# Patient Record
Sex: FEMALE | Race: WHITE | NOT HISPANIC OR LATINO | Employment: FULL TIME | URBAN - METROPOLITAN AREA
[De-identification: names, ages, dates, MRNs, and addresses within clinical notes are randomized per-mention and may not be internally consistent; named-entity substitution may affect disease eponyms.]

---

## 2020-01-10 ENCOUNTER — HOSPITAL ENCOUNTER (OUTPATIENT)
Facility: MEDICAL CENTER | Age: 20
End: 2020-01-10
Attending: NURSE PRACTITIONER
Payer: MEDICAID

## 2020-01-10 ENCOUNTER — OFFICE VISIT (OUTPATIENT)
Dept: URGENT CARE | Facility: PHYSICIAN GROUP | Age: 20
End: 2020-01-10
Payer: MEDICAID

## 2020-01-10 VITALS
WEIGHT: 118 LBS | OXYGEN SATURATION: 97 % | DIASTOLIC BLOOD PRESSURE: 72 MMHG | HEART RATE: 84 BPM | SYSTOLIC BLOOD PRESSURE: 110 MMHG | RESPIRATION RATE: 16 BRPM | TEMPERATURE: 98.9 F

## 2020-01-10 DIAGNOSIS — R10.2 VAGINAL PAIN: ICD-10-CM

## 2020-01-10 LAB
APPEARANCE UR: NORMAL
BILIRUB UR STRIP-MCNC: NEGATIVE MG/DL
COLOR UR AUTO: YELLOW
GLUCOSE UR STRIP.AUTO-MCNC: NEGATIVE MG/DL
INT CON NEG: NORMAL
INT CON POS: NORMAL
KETONES UR STRIP.AUTO-MCNC: NORMAL MG/DL
LEUKOCYTE ESTERASE UR QL STRIP.AUTO: NORMAL
NITRITE UR QL STRIP.AUTO: NEGATIVE
PH UR STRIP.AUTO: 6 [PH] (ref 5–8)
POC URINE PREGNANCY TEST: NEGATIVE
PROT UR QL STRIP: 100 MG/DL
RBC UR QL AUTO: NORMAL
SP GR UR STRIP.AUTO: >1.03
UROBILINOGEN UR STRIP-MCNC: 1 MG/DL

## 2020-01-10 PROCEDURE — 87591 N.GONORRHOEAE DNA AMP PROB: CPT

## 2020-01-10 PROCEDURE — 87186 SC STD MICRODIL/AGAR DIL: CPT

## 2020-01-10 PROCEDURE — 87510 GARDNER VAG DNA DIR PROBE: CPT

## 2020-01-10 PROCEDURE — 87660 TRICHOMONAS VAGIN DIR PROBE: CPT

## 2020-01-10 PROCEDURE — 81025 URINE PREGNANCY TEST: CPT | Performed by: NURSE PRACTITIONER

## 2020-01-10 PROCEDURE — 99203 OFFICE O/P NEW LOW 30 MIN: CPT | Mod: 25 | Performed by: NURSE PRACTITIONER

## 2020-01-10 PROCEDURE — 87086 URINE CULTURE/COLONY COUNT: CPT

## 2020-01-10 PROCEDURE — 81002 URINALYSIS NONAUTO W/O SCOPE: CPT | Performed by: NURSE PRACTITIONER

## 2020-01-10 PROCEDURE — 87529 HSV DNA AMP PROBE: CPT

## 2020-01-10 PROCEDURE — 87491 CHLMYD TRACH DNA AMP PROBE: CPT

## 2020-01-10 PROCEDURE — 87480 CANDIDA DNA DIR PROBE: CPT

## 2020-01-10 PROCEDURE — 87077 CULTURE AEROBIC IDENTIFY: CPT

## 2020-01-10 PROCEDURE — 99000 SPECIMEN HANDLING OFFICE-LAB: CPT | Performed by: NURSE PRACTITIONER

## 2020-01-10 RX ORDER — LIDOCAINE 5% 5 G/100G
1 CREAM TOPICAL 3 TIMES DAILY PRN
Qty: 1 TUBE | Refills: 0 | Status: SHIPPED | OUTPATIENT
Start: 2020-01-10 | End: 2020-03-30

## 2020-01-10 SDOH — HEALTH STABILITY: MENTAL HEALTH: HOW OFTEN DO YOU HAVE A DRINK CONTAINING ALCOHOL?: NEVER

## 2020-01-10 ASSESSMENT — ENCOUNTER SYMPTOMS
VAGINITIS: 1
CONSTIPATION: 0
CHILLS: 0
FLANK PAIN: 0
WHEEZING: 0
NAUSEA: 1
ABDOMINAL PAIN: 0
EYES NEGATIVE: 1
DIARRHEA: 0
FEVER: 0
RESPIRATORY NEGATIVE: 1
CARDIOVASCULAR NEGATIVE: 1
SHORTNESS OF BREATH: 0
VOMITING: 0

## 2020-01-10 NOTE — PROGRESS NOTES
Subjective:   Maddison Quinn is a 19 y.o. female who presents for Vaginal Pain (noticed she has lumps on her vaginal is not very painful )        Vaginitis   The patient's primary symptoms include genital lesions and a genital odor. The patient's pertinent negatives include no missed menses or vaginal discharge. This is a new problem. The current episode started in the past 7 days. The problem occurs constantly. The problem has been unchanged. The pain is moderate. Associated symptoms include dysuria, nausea and painful intercourse. Pertinent negatives include no abdominal pain, chills, constipation, diarrhea, fever, flank pain, frequency, hematuria, rash, urgency or vomiting. She has tried nothing for the symptoms. The treatment provided no relief. She is sexually active. No, her partner does not have an STD.        Review of Systems   Constitutional: Negative for chills and fever.   Eyes: Negative.    Respiratory: Negative.  Negative for shortness of breath and wheezing.    Cardiovascular: Negative.  Negative for chest pain.   Gastrointestinal: Positive for nausea. Negative for abdominal pain, constipation, diarrhea and vomiting.   Genitourinary: Positive for dysuria. Negative for flank pain, frequency, hematuria, missed menses, urgency and vaginal discharge.        Painful vaginal opening when urinating   Skin: Negative.  Negative for rash.     Patient's PMH, SocHx, SurgHx, FamHx, Drug allergies and medications reviewed.     Objective:   /72   Pulse 84   Temp 37.2 °C (98.9 °F)   Resp 16   Wt 53.5 kg (118 lb)   SpO2 97%   Physical Exam  Vitals signs reviewed. Exam conducted with a chaperone present.   Constitutional:       Appearance: She is well-developed.   HENT:      Right Ear: Hearing normal.      Left Ear: Hearing normal.   Eyes:      Conjunctiva/sclera: Conjunctivae normal.      Pupils: Pupils are equal, round, and reactive to light.   Cardiovascular:      Rate and Rhythm: Normal rate and  regular rhythm.      Heart sounds: Normal heart sounds. No murmur.   Pulmonary:      Effort: Pulmonary effort is normal. No respiratory distress.      Breath sounds: Normal breath sounds.   Abdominal:      General: Bowel sounds are normal.      Palpations: Abdomen is soft.      Tenderness: There is no tenderness.   Genitourinary:     Labia:         Right: Tenderness and lesion present.         Left: No tenderness or lesion.           Comments: Did not put speculum into vaginal canal due to patient's pain.  Skin:     General: Skin is warm and dry.      Capillary Refill: Capillary refill takes less than 2 seconds.   Neurological:      Mental Status: She is alert and oriented to person, place, and time.   Psychiatric:         Mood and Affect: Mood normal.         Speech: Speech normal.         Behavior: Behavior normal.         Thought Content: Thought content normal.         Judgment: Judgment normal.           Assessment/Plan:   Assessment    1. Vaginal pain  - POCT Urinalysis  - POCT Pregnancy  - VAGINAL PATHOGENS DNA PANEL; Future  - HSV 1/2 By PCR(Herpes)+[id number]; Future  - CHLAMYDIA/GC PCR URINE OR SWAB; Future  - Lidocaine 5 % Cream; 1 Application by Apply externally route 3 times a day as needed.  Dispense: 1 Tube; Refill: 0  - URINE CULTURE(NEW); Future    UA with moderate leuks and blood - ? Related to contamination. Will send for culture. Will call with results once available.  Pregnancy negative    Differential diagnosis, natural history, supportive care, and indications for immediate follow-up discussed.     **Please note that all invasive procedures during this visit were performed by myself and/or the Medical Assistant under the supervision of the PA or MD in office**

## 2020-01-11 ENCOUNTER — TELEPHONE (OUTPATIENT)
Dept: URGENT CARE | Facility: PHYSICIAN GROUP | Age: 20
End: 2020-01-11

## 2020-01-11 DIAGNOSIS — N76.0 BACTERIAL VAGINOSIS: ICD-10-CM

## 2020-01-11 DIAGNOSIS — B96.89 BACTERIAL VAGINOSIS: ICD-10-CM

## 2020-01-11 DIAGNOSIS — R10.2 VAGINAL PAIN: ICD-10-CM

## 2020-01-11 LAB
C TRACH DNA SPEC QL NAA+PROBE: NEGATIVE
CANDIDA DNA VAG QL PROBE+SIG AMP: NEGATIVE
G VAGINALIS DNA VAG QL PROBE+SIG AMP: POSITIVE
HSV1 DNA SPEC QL NAA+PROBE: POSITIVE
HSV2 DNA SPEC QL NAA+PROBE: NEGATIVE
N GONORRHOEA DNA SPEC QL NAA+PROBE: NEGATIVE
SPECIMEN SOURCE: ABNORMAL
SPECIMEN SOURCE: NORMAL
T VAGINALIS DNA VAG QL PROBE+SIG AMP: NEGATIVE

## 2020-01-11 RX ORDER — METRONIDAZOLE 500 MG/1
500 TABLET ORAL 2 TIMES DAILY
Qty: 14 TAB | Refills: 0 | Status: SHIPPED | OUTPATIENT
Start: 2020-01-11 | End: 2020-01-18

## 2020-01-11 NOTE — TELEPHONE ENCOUNTER
Called and spoke to patient regarding vaginal pathogens results. Sent prescription to pharmacy and advised to avoid alcohol. Will call with other results once available.

## 2020-01-13 ENCOUNTER — TELEPHONE (OUTPATIENT)
Dept: URGENT CARE | Facility: CLINIC | Age: 20
End: 2020-01-13

## 2020-01-13 DIAGNOSIS — N39.0 URINARY TRACT INFECTION WITHOUT HEMATURIA, SITE UNSPECIFIED: ICD-10-CM

## 2020-01-13 LAB
BACTERIA UR CULT: ABNORMAL
BACTERIA UR CULT: ABNORMAL
SIGNIFICANT IND 70042: ABNORMAL
SITE SITE: ABNORMAL
SOURCE SOURCE: ABNORMAL

## 2020-01-13 RX ORDER — NITROFURANTOIN 25; 75 MG/1; MG/1
100 CAPSULE ORAL EVERY 12 HOURS
Qty: 10 CAP | Refills: 0 | Status: SHIPPED | OUTPATIENT
Start: 2020-01-13 | End: 2020-01-18

## 2020-01-13 NOTE — TELEPHONE ENCOUNTER
Called and spoke to patient regarding G/C, HSV, and urine culture results. Prescription sent to pharmacy. Discussed signs/symptoms of worsening infection and when to return to the office.

## 2020-01-29 ENCOUNTER — OFFICE VISIT (OUTPATIENT)
Dept: URGENT CARE | Facility: PHYSICIAN GROUP | Age: 20
End: 2020-01-29
Payer: MEDICAID

## 2020-01-29 VITALS
DIASTOLIC BLOOD PRESSURE: 76 MMHG | RESPIRATION RATE: 14 BRPM | HEART RATE: 78 BPM | BODY MASS INDEX: 19.63 KG/M2 | OXYGEN SATURATION: 97 % | SYSTOLIC BLOOD PRESSURE: 130 MMHG | HEIGHT: 64 IN | WEIGHT: 115 LBS | TEMPERATURE: 98.4 F

## 2020-01-29 DIAGNOSIS — J06.9 VIRAL URI WITH COUGH: ICD-10-CM

## 2020-01-29 PROCEDURE — 99214 OFFICE O/P EST MOD 30 MIN: CPT | Performed by: FAMILY MEDICINE

## 2020-01-29 RX ORDER — FLUTICASONE PROPIONATE 50 MCG
1 SPRAY, SUSPENSION (ML) NASAL 2 TIMES DAILY
Qty: 1 BOTTLE | Refills: 0 | Status: SHIPPED | OUTPATIENT
Start: 2020-01-29 | End: 2020-03-30

## 2020-01-29 RX ORDER — FEXOFENADINE HCL 180 MG/1
180 TABLET ORAL DAILY
Qty: 30 TAB | Refills: 0 | Status: SHIPPED | OUTPATIENT
Start: 2020-01-29 | End: 2020-03-30

## 2020-01-29 NOTE — LETTER
January 29, 2020         Patient: Maddison Quinn   YOB: 2000   Date of Visit: 1/29/2020           To Whom it May Concern:    Maddison Quinn was seen in my clinic on 1/29/2020.         If you have any questions or concerns, please don't hesitate to call.        Sincerely,           Jaime Meza M.D.  Electronically Signed

## 2020-01-30 NOTE — PROGRESS NOTES
"CC:  cough        Cough  This is a new problem. The current episode started 3 days ago. The problem has been unchanged. The problem occurs constantly. The cough is dry. Associated symptoms include : fatigue, runny nose, sneezing, eye irritation.    She denies: muscle aches, fever. Pertinent negatives include no   headaches, nausea, vomiting, diarrhea, sweats, weight loss or wheezing. Nothing aggravates the symptoms.  Patient has tried nothing for the symptoms. There is no history of asthma.      Past medical history was unremarkable and not pertinent to current issue      Social History     Tobacco Use   • Smoking status: Current Some Day Smoker     Packs/day: 0.00     Types: Cigarettes   • Smokeless tobacco: Never Used   • Tobacco comment: vapes now    Substance Use Topics   • Alcohol use: Yes     Frequency: Never     Comment: Seldom   • Drug use: Yes     Types: Marijuana         Current Outpatient Medications on File Prior to Visit   Medication Sig Dispense Refill   • Lidocaine 5 % Cream 1 Application by Apply externally route 3 times a day as needed. 1 Tube 0     No current facility-administered medications on file prior to visit.                     Review of Systems   Constitutional: Negative for fever and weight loss.   HENT: negative for otalgia  Cardiovascular - denies chest pain or dyspnea  Respiratory: Positive for cough.  .  Negative for wheezing.    Neurological: Negative for headaches.   GI - denies nausea, vomiting or diarrhea  Neuro - denies numbness or tingling.            Objective:     /76   Pulse 78   Temp 36.9 °C (98.4 °F) (Temporal)   Resp 14   Ht 1.626 m (5' 4\")   Wt 52.2 kg (115 lb)   SpO2 97%     Physical Exam   Constitutional: patient is oriented to person, place, and time. Patient appears well-developed and well-nourished. No distress.   HENT:   Head: Normocephalic and atraumatic.   Right Ear: External ear normal.   Left Ear: External ear normal.   Nose: Mucosal edema  present. " Right sinus exhibits no maxillary sinus tenderness. Left sinus exhibits no maxillary sinus tenderness.   Mouth/Throat: Mucous membranes are normal. No oral lesions.  No posterior pharyngeal erythema.  No oropharyngeal exudate or posterior oropharyngeal edema.   Eyes: Conjunctivae and EOM are normal. Pupils are equal, round, and reactive to light. Right eye exhibits no discharge. Left eye exhibits no discharge. No scleral icterus.   Neck: Normal range of motion. Neck supple. No tracheal deviation present.   Cardiovascular: Normal rate, regular rhythm and normal heart sounds.  Exam reveals no friction rub.    Pulmonary/Chest: Effort normal. No respiratory distress. Patient has no wheezes or rhonchi. Patient has no rales.    Musculoskeletal:  exhibits no edema.   Lymphadenopathy:     Patient has no cervical adenopathy.      Neurological: patient is alert and oriented to person, place, and time.   Skin: Skin is warm and dry. No rash noted. No erythema.   Psychiatric: patient  has a normal mood and affect.  behavior is normal.   Nursing note and vitals reviewed.              Assessment/Plan:       1. Viral URI with cough  Rapid strep, influenza neg  - fluticasone (FLONASE) 50 MCG/ACT nasal spray; Spray 1 Spray in nose 2 times a day.  Dispense: 1 Bottle; Refill: 0  - fexofenadine (ALLEGRA) 180 MG tablet; Take 1 Tab by mouth every day.  Dispense: 30 Tab; Refill: 0         Follow up in one week if no improvement

## 2020-03-30 ENCOUNTER — INITIAL PRENATAL (OUTPATIENT)
Dept: OBGYN | Facility: CLINIC | Age: 20
End: 2020-03-30
Payer: MEDICAID

## 2020-03-30 ENCOUNTER — HOSPITAL ENCOUNTER (OUTPATIENT)
Dept: LAB | Facility: MEDICAL CENTER | Age: 20
End: 2020-03-30
Attending: NURSE PRACTITIONER
Payer: MEDICAID

## 2020-03-30 VITALS
WEIGHT: 118.8 LBS | SYSTOLIC BLOOD PRESSURE: 98 MMHG | DIASTOLIC BLOOD PRESSURE: 58 MMHG | HEIGHT: 64 IN | BODY MASS INDEX: 20.28 KG/M2

## 2020-03-30 DIAGNOSIS — N93.8 DUB (DYSFUNCTIONAL UTERINE BLEEDING): ICD-10-CM

## 2020-03-30 DIAGNOSIS — N93.8 DUB (DYSFUNCTIONAL UTERINE BLEEDING): Primary | ICD-10-CM

## 2020-03-30 LAB
B-HCG SERPL-ACNC: ABNORMAL MIU/ML (ref 0–5)
INT CON NEG: NEGATIVE
INT CON POS: POSITIVE
POC URINE PREGNANCY TEST: POSITIVE

## 2020-03-30 PROCEDURE — 36415 COLL VENOUS BLD VENIPUNCTURE: CPT

## 2020-03-30 PROCEDURE — 99203 OFFICE O/P NEW LOW 30 MIN: CPT | Performed by: NURSE PRACTITIONER

## 2020-03-30 PROCEDURE — 84702 CHORIONIC GONADOTROPIN TEST: CPT

## 2020-03-30 PROCEDURE — 81025 URINE PREGNANCY TEST: CPT | Performed by: NURSE PRACTITIONER

## 2020-03-30 ASSESSMENT — ENCOUNTER SYMPTOMS
VOMITING: 1
NAUSEA: 1

## 2020-03-30 NOTE — PROGRESS NOTES
Patient here for GYN/DUB  LMP: 2/13/2020  BARRY: 11/19/2020  GA: 6w4d  # [phone]  Pharmacy verified   Pt states having extreme nausea and breast pain, pt states was diagnosed with BV but was unable to keep pills down.   Pregnancy test in clinic today Positive

## 2020-03-30 NOTE — PATIENT INSTRUCTIONS
P:  1.  GC/CT deferred. Pap deferred to maternal age.         2.  Prenatal labs @ NOB.        3.  Discussed PNV, diet, and adequate water intake        4.  NOB packet given        5.  Return to office in 1 wk w  US w MDs.         6.  Beta quants ordered.         7.  SAB precautions given.

## 2020-03-30 NOTE — PROGRESS NOTES
"Subjective:   Maddison Quinn is a 19 y.o.   @ EGA: 6w4d BARRY: Estimated Date of Delivery: 20  per [facility] who presents for her DUB visit  She reports fatigue and NV.  Reports no FM, VB, LOF, or cramping.  Denies dysuria, vaginal DC.  Pt is single and lives with roommate. Not presently working.  Pregnancy is unplanned but wanted.       Gynecologic Exam (DUB)          HPI  Review of Systems   Constitutional: Positive for malaise/fatigue.   Gastrointestinal: Positive for nausea and vomiting.   All other systems reviewed and are negative.       Objective:     BP (!) 98/58   Ht 1.626 m (5' 4\")   Wt 53.9 kg (118 lb 12.8 oz)   LMP 2020   BMI 20.39 kg/m²            FHTs: unable to auscultate due to early GA. +CM on BSUS.        Fundal ht: 6     Physical Exam  Vitals signs and nursing note reviewed.   Constitutional:       Appearance: She is well-developed.   HENT:      Head: Normocephalic and atraumatic.   Neck:      Musculoskeletal: Normal range of motion and neck supple.      Thyroid: No thyromegaly.   Cardiovascular:      Rate and Rhythm: Normal rate and regular rhythm.      Heart sounds: Normal heart sounds.   Pulmonary:      Effort: Pulmonary effort is normal.      Breath sounds: Normal breath sounds.   Abdominal:      General: Bowel sounds are normal.      Palpations: Abdomen is soft.   Musculoskeletal: Normal range of motion.   Skin:     General: Skin is warm and dry.      Capillary Refill: Capillary refill takes less than 2 seconds.   Neurological:      Mental Status: She is alert and oriented to person, place, and time.   Psychiatric:         Behavior: Behavior normal.         Thought Content: Thought content normal.         Judgment: Judgment normal.               A:   1.  IUP @ 6w4d BARRY: Estimated Date of Delivery: 20 per LN         2.  S=D        3.    Patient Active Problem List    Diagnosis Date Noted   • DUB (dysfunctional uterine bleeding) 2020         P:  1.  GC/CT " deferred. Pap deferred to maternal age.         2.  Prenatal labs @ NOB.        3.  Discussed PNV, diet, and adequate water intake        4.  NOB packet given        5.  Return to office in 1 wk w  US w MDs.         6.  Beta quants ordered.         7.  SAB precautions given.

## 2020-04-01 ENCOUNTER — HOSPITAL ENCOUNTER (OUTPATIENT)
Dept: LAB | Facility: MEDICAL CENTER | Age: 20
End: 2020-04-01
Attending: NURSE PRACTITIONER
Payer: MEDICAID

## 2020-04-01 DIAGNOSIS — N93.8 DUB (DYSFUNCTIONAL UTERINE BLEEDING): ICD-10-CM

## 2020-04-01 LAB — B-HCG SERPL-ACNC: ABNORMAL MIU/ML (ref 0–5)

## 2020-04-01 PROCEDURE — 84702 CHORIONIC GONADOTROPIN TEST: CPT

## 2020-04-01 PROCEDURE — 36415 COLL VENOUS BLD VENIPUNCTURE: CPT

## 2020-04-07 ENCOUNTER — INITIAL PRENATAL (OUTPATIENT)
Dept: OBGYN | Facility: CLINIC | Age: 20
End: 2020-04-07
Payer: MEDICAID

## 2020-04-07 VITALS — SYSTOLIC BLOOD PRESSURE: 100 MMHG | DIASTOLIC BLOOD PRESSURE: 60 MMHG | WEIGHT: 119 LBS | BODY MASS INDEX: 20.43 KG/M2

## 2020-04-07 DIAGNOSIS — N91.2 AMENORRHEA: ICD-10-CM

## 2020-04-07 PROCEDURE — 76830 TRANSVAGINAL US NON-OB: CPT | Performed by: OBSTETRICS & GYNECOLOGY

## 2020-04-07 PROCEDURE — 99214 OFFICE O/P EST MOD 30 MIN: CPT | Mod: 25 | Performed by: OBSTETRICS & GYNECOLOGY

## 2020-04-07 NOTE — PROGRESS NOTES
CC: Missed menses    Maddison Quinn is a 19 y.o.  who presents presents due to missed menses. Patient's last menstrual period was 2020.  She was not using anything for birthcontrol.  This is not a planned but is a desired pregnancy.   Reports she has been feeling thus far in pregnancy. She reports nausea but denies vomiting, reports headaches, denies dysuria, denies  vaginal bleeding/spotting, and reports contractions/cramping.    Partner: Papito    Review of systems:  Pertinent positives documented in HPI and all other systems reviewed & are negative    GYN History:  Patient's last menstrual period was 2020. Menarche @15-16.  Not sure if periods were regular leading up to this. No history of sexually transmitted diseases.       OB History:    OB History    Para Term  AB Living   1             SAB TAB Ectopic Molar Multiple Live Births                    # Outcome Date GA Lbr Kenneth/2nd Weight Sex Delivery Anes PTL Lv   1 Current                All PMH, PSH, allergies, social history and FH reviewed and updated today:  Past Medical History:  Past Medical History:   Diagnosis Date   • Anxiety    • Depression    • Head ache    • Urinary tract infection        Past Surgical History:  No past surgical history on file.    Medications:   Current Outpatient Medications Ordered in Epic   Medication Sig Dispense Refill   • Prenatal Vit-Fe Fumarate-FA (PRENATAL 19 PO) Take  by mouth.       No current Epic-ordered facility-administered medications on file.        Allergies: Patient has no known allergies.    Social History:  Social History     Socioeconomic History   • Marital status: Single     Spouse name: Not on file   • Number of children: Not on file   • Years of education: Not on file   • Highest education level: Not on file   Occupational History   • Not on file   Social Needs   • Financial resource strain: Not on file   • Food insecurity     Worry: Not on file     Inability: Not on file   •  Transportation needs     Medical: Not on file     Non-medical: Not on file   Tobacco Use   • Smoking status: Current Some Day Smoker     Packs/day: 0.00     Types: Cigarettes   • Smokeless tobacco: Never Used   • Tobacco comment: vapes now    Substance and Sexual Activity   • Alcohol use: Yes     Frequency: Never     Comment: Seldom   • Drug use: Yes     Types: Marijuana   • Sexual activity: Yes     Partners: Male     Comment: havent been on BC pill for 8 months    Lifestyle   • Physical activity     Days per week: Not on file     Minutes per session: Not on file   • Stress: Not on file   Relationships   • Social connections     Talks on phone: Not on file     Gets together: Not on file     Attends Mosque service: Not on file     Active member of club or organization: Not on file     Attends meetings of clubs or organizations: Not on file     Relationship status: Not on file   • Intimate partner violence     Fear of current or ex partner: Not on file     Emotionally abused: Not on file     Physically abused: Not on file     Forced sexual activity: Not on file   Other Topics Concern   • Not on file   Social History Narrative   • Not on file       Family History:  No family history on file.        Objective:   Vitals:  /60   Wt 54 kg (119 lb)   Body mass index is 20.43 kg/m². (Goal BM I>18 <25)  Exam:  General: appears stated age, is in no apparent distress, is well developed and well nourished  Head: normocephalic, non-tender  Neck: neck is supple  Abdomen: Bowel sounds positive, nondistended, soft, nontender x4, no rebound or guarding. No organomegaly. No masses.   Female GYN: normal female external genitalia without lesions  Skin: No rashes, or ulcers or lesions seen  Psychiatric: appropriate affect, alert and oriented x3, intact judgment and insight.    Procedure:  Transvaginal US performed by me and per my read:    Indication: amenorrhea.     Findings: mendez intrauterine pregnancy @ 7w4d by CRL.  Positive yolk sac. Positive fetal cardiac activity @ 160 BPM. No adnexal masses. Cervical length 3.8 cm. No free fluid in the cul-de-sac.    Impression: viable IUP @ 7w4d.  BARRY by US of 20.    Recent Results (from the past 336 hour(s))   POCT Pregnancy    Collection Time: 20  8:41 AM   Result Value Ref Range    POC Urine Pregnancy Test Positive Negative    Internal Control Positive Positive     Internal Control Negative Negative    HCG QUANTITATIVE    Collection Time: 20  1:47 PM   Result Value Ref Range    Bhcg 63054.0 (H) 0.0 - 5.0 mIU/mL   HCG QUANTITATIVE    Collection Time: 20  3:49 PM   Result Value Ref Range    Bhcg 44336.0 (H) 0.0 - 5.0 mIU/mL      Pregnancy exam/test positive    A/P:   19 y.o.  here for amenorrhea  No diagnosis found.    #Missed menses - amenorrhea due to pregnancy.  US today is c/w LMP. BARRY of 20.  Discussed pregnancy with patient who is happy.    --Normal pregnancy s/s discussed  --Advised prenatal vitamins, healthy well rounded diet, adequate hydration, and continued exercise.    #Cervical cancer screening not indicated  #Will order prenatal labs and any additional imaging/testing needed at new OB visit  #SAB precautions discussed.  #Follow up in 5-6 weeks for new OB visit.    30 minutes were spent in face-to-face patient contact with patient, greater than 50% of which was was spent in counseling and coordination of care for her newly diagnosed pregnancy including medical and surgical options of care.    NICOLAS

## 2020-04-07 NOTE — PROGRESS NOTES
Pt here today for OB follow up  Pt states off and on cramps,  nausea and vominting  Reports +  Good # [phone]  Pharmacy Confirmed. W/ pt

## 2020-12-09 ENCOUNTER — TELEPHONE (OUTPATIENT)
Dept: SCHEDULING | Facility: IMAGING CENTER | Age: 20
End: 2020-12-09

## 2020-12-29 ENCOUNTER — TELEMEDICINE (OUTPATIENT)
Dept: MEDICAL GROUP | Facility: CLINIC | Age: 20
End: 2020-12-29
Payer: MEDICAID

## 2020-12-29 DIAGNOSIS — F32.9 MAJOR DEPRESSION, CHRONIC: ICD-10-CM

## 2020-12-29 DIAGNOSIS — F41.9 ANXIETY: ICD-10-CM

## 2020-12-29 PROCEDURE — 99213 OFFICE O/P EST LOW 20 MIN: CPT | Performed by: PHYSICIAN ASSISTANT

## 2020-12-29 RX ORDER — HYDROXYZINE HYDROCHLORIDE 25 MG/1
25 TABLET, FILM COATED ORAL 3 TIMES DAILY PRN
Qty: 60 TAB | Refills: 0 | Status: SHIPPED | OUTPATIENT
Start: 2020-12-29 | End: 2022-07-30 | Stop reason: SDUPTHER

## 2020-12-29 RX ORDER — ESCITALOPRAM OXALATE 10 MG/1
10 TABLET ORAL DAILY
Qty: 30 TAB | Refills: 1 | Status: SHIPPED | OUTPATIENT
Start: 2020-12-29 | End: 2021-02-23 | Stop reason: SDUPTHER

## 2020-12-29 RX ORDER — ACETAMINOPHEN 325 MG/1
650 TABLET ORAL
COMMUNITY
Start: 2020-11-14

## 2020-12-29 ASSESSMENT — PATIENT HEALTH QUESTIONNAIRE - PHQ9
5. POOR APPETITE OR OVEREATING: 3 - NEARLY EVERY DAY
SUM OF ALL RESPONSES TO PHQ QUESTIONS 1-9: 12
CLINICAL INTERPRETATION OF PHQ2 SCORE: 1

## 2020-12-29 NOTE — PROGRESS NOTES
Virtual Visit: Established Patient   This visit was conducted via Zoom using secure and encrypted videoconferencing technology. The patient was in a private location in the state of [place].    The patient's identity was confirmed and verbal consent was obtained for this virtual visit.    Subjective:   CC:   Chief Complaint   Patient presents with   • Establish Care   • Anxiety   • Postpartum care     son was 11/13 feels like depression getting worse        Maddison Quinn is a 20 y.o. female presenting for evaluation and management of:    Patient states that she tested positive for covid on December 11th.  She quarantined for 14 days in her room.      Patient did have a vaginal delivery in November.  Patient states that she has been feeling depression and anxiety.  She states that she has been on medication in the past but it was worse after she delivered her son and her grandmother passed.  She states that she was on hydroxyzine.  She states that she was not on a daily medication.  She states that she has had thoughts of hurting herself in the last year but does not have a plan.   She states that is currently not breast feeding.     ROS denies any other symptoms unless previously indicated.   Denies any recent fevers or chills. No nausea or vomiting. No chest pains or shortness of breath.     No Known Allergies    Current medicines (including changes today)  Current Outpatient Medications   Medication Sig Dispense Refill   • acetaminophen (TYLENOL) 325 MG Tab Take 650 mg by mouth.     • escitalopram (LEXAPRO) 10 MG Tab Take 1 Tab by mouth every day. 30 Tab 1   • hydrOXYzine HCl (ATARAX) 25 MG Tab Take 1 Tab by mouth 3 times a day as needed for Anxiety. 60 Tab 0   • Prenatal Vit-Fe Fumarate-FA (PRENATAL 19 PO) Take  by mouth.       No current facility-administered medications for this visit.        Patient Active Problem List    Diagnosis Date Noted   • Anxiety 12/29/2020   • Major depression, chronic 12/29/2020    • DUB (dysfunctional uterine bleeding) 03/30/2020       History reviewed. No pertinent family history.    She  has a past medical history of Anxiety, Depression, Head ache, Urinary tract infection, and Vaginal delivery (11/13/2020). She also has no past medical history of Addisons disease (HCC), Adrenal disorder (HCC), Allergy, Anemia, Arrhythmia, Arthritis, Asthma, Blood transfusion without reported diagnosis, Cancer ([facility]), Cataract, CHF (congestive heart failure) ([facility]), Clotting disorder (HCC), COPD (chronic obstructive pulmonary disease) (HCC), Cushings syndrome (HCC), Diabetes (HCC), Diabetic neuropathy (HCC), GERD (gastroesophageal reflux disease), Glaucoma, Goiter, Heart attack (HCC), Heart murmur, HIV (human immunodeficiency virus infection) ([facility]), Hyperlipidemia, Hypertension, IBD (inflammatory bowel disease), Kidney disease, Meningitis, Migraine, Muscle disorder, Osteoporosis, Parathyroid disorder (HCC), Pituitary disease (HCC), Pulmonary emphysema (HCC), Seizure ([facility]), Sickle cell disease ([facility]), Stroke ([facility]), Substance abuse ([facility]), Thyroid disease, or Tuberculosis.  She  has no past surgical history on file.       Objective:   LMP 02/13/2020     Physical Exam:  Constitutional: Alert, no distress, well-groomed.  Skin: No rashes in visible areas.  Eye: Round. Conjunctiva clear, lids normal. No icterus.   ENMT: Lips pink without lesions, good dentition, moist mucous membranes. Phonation normal.  Neck: No masses, no thyromegaly. Moves freely without pain.  Respiratory: Unlabored respiratory effort, no cough or audible wheeze  Psych: Alert and oriented x3, normal affect and mood.       Assessment and Plan:   The following treatment plan was discussed:     1. Anxiety  - escitalopram (LEXAPRO) 10 MG Tab; Take 1 Tab by mouth every day.  Dispense: 30 Tab; Refill: 1  - hydrOXYzine HCl (ATARAX) 25 MG Tab; Take 1 Tab by mouth 3 times a day as needed for Anxiety.  Dispense: 60 Tab; Refill: 0    2. Major depression,  chronic  - escitalopram (LEXAPRO) 10 MG Tab; Take 1 Tab by mouth every day.  Dispense: 30 Tab; Refill: 1  - hydrOXYzine HCl (ATARAX) 25 MG Tab; Take 1 Tab by mouth 3 times a day as needed for Anxiety.  Dispense: 60 Tab; Refill: 0    Other orders  - acetaminophen (TYLENOL) 325 MG Tab; Take 650 mg by mouth.    Side effects and risks of medication discussed with patient.  We will try her on Lexapro and hydroxyzine with the plan to follow-up in 2 weeks, sooner if needed.  I do not believe the patient wants to harm herself and is actually making efforts to improve her health.  She understands to contact me sooner with any problems.    Follow-up: No follow-ups on file.

## 2021-01-12 ENCOUNTER — TELEMEDICINE (OUTPATIENT)
Dept: MEDICAL GROUP | Facility: CLINIC | Age: 21
End: 2021-01-12
Payer: MEDICAID

## 2021-01-12 DIAGNOSIS — F32.9 MAJOR DEPRESSION, CHRONIC: ICD-10-CM

## 2021-01-12 DIAGNOSIS — Z30.011 INITIATION OF OCP (BCP): ICD-10-CM

## 2021-01-12 DIAGNOSIS — F41.9 ANXIETY: ICD-10-CM

## 2021-01-12 PROCEDURE — 99213 OFFICE O/P EST LOW 20 MIN: CPT | Mod: CR | Performed by: PHYSICIAN ASSISTANT

## 2021-01-12 RX ORDER — PRENATAL VIT/IRON FUM/FOLIC AC 27MG-0.8MG
1 TABLET ORAL DAILY
COMMUNITY
End: 2021-03-08

## 2021-01-12 RX ORDER — IBUPROFEN 600 MG/1
600 TABLET ORAL
COMMUNITY
Start: 2020-11-14

## 2021-01-12 NOTE — PROGRESS NOTES
Virtual Visit: Established Patient   This visit was conducted via Zoom using secure and encrypted videoconferencing technology. The patient was in a private location in the state of [place].    The patient's identity was confirmed and verbal consent was obtained for this virtual visit.    Subjective:   CC:   Chief Complaint   Patient presents with   • Depression     Working well       Maddison Quinn is a 20 y.o. female presenting for evaluation and management of:    Medication check.  Patient states that when she started to take the medication she did feel some dizziness but she was able to function.  She states that the dizziness has not been present in the last few days.   She states that she is feeling a lot better with the medication.   She would like to continue with the medication at this time.   She states that the dose is working well for her.   She did have to use the hydroxyzine once and it did help.     Patient would like to restart birth control.  She was on tri-lo-ketan and states that this worked well for her.  She is okay with starting this again.       ROS denies any other symptoms unless previously indicated.    Denies any recent fevers or chills. No nausea or vomiting. No chest pains or shortness of breath.     No Known Allergies    Current medicines (including changes today)  Current Outpatient Medications   Medication Sig Dispense Refill   • acetaminophen (TYLENOL) 325 MG Tab Take 650 mg by mouth.     • escitalopram (LEXAPRO) 10 MG Tab Take 1 Tab by mouth every day. 30 Tab 1   • hydrOXYzine HCl (ATARAX) 25 MG Tab Take 1 Tab by mouth 3 times a day as needed for Anxiety. 60 Tab 0     No current facility-administered medications for this visit.        Patient Active Problem List    Diagnosis Date Noted   • Initiation of OCP (BCP) 01/12/2021   • Anxiety 12/29/2020   • Major depression, chronic 12/29/2020   • DUB (dysfunctional uterine bleeding) 03/30/2020       No family history on file.    She   has a past medical history of Anxiety, Depression, Head ache, Urinary tract infection, and Vaginal delivery (11/13/2020). She also has no past medical history of Addisons disease (HCC), Adrenal disorder (HCC), Allergy, Anemia, Arrhythmia, Arthritis, Asthma, Blood transfusion without reported diagnosis, Cancer ([facility]), Cataract, CHF (congestive heart failure) (HCC), Clotting disorder (HCC), COPD (chronic obstructive pulmonary disease) ([facility]), Cushings syndrome ([facility]), Diabetes (HCC), Diabetic neuropathy (HCC), GERD (gastroesophageal reflux disease), Glaucoma, Goiter, Heart attack (HCC), Heart murmur, HIV (human immunodeficiency virus infection) ([facility]), Hyperlipidemia, Hypertension, IBD (inflammatory bowel disease), Kidney disease, Meningitis, Migraine, Muscle disorder, Osteoporosis, Parathyroid disorder (HCC), Pituitary disease (HCC), Pulmonary emphysema (HCC), Seizure ([facility]), Sickle cell disease (HCC), Stroke ([facility]), Substance abuse ([facility]), Thyroid disease, or Tuberculosis.  She  has no past surgical history on file.       Objective:   LMP 02/13/2020     Physical Exam:  Constitutional: Alert, no distress, well-groomed.  Skin: No rashes in visible areas.  Eye: Round. Conjunctiva clear, lids normal. No icterus.   ENMT: Lips pink without lesions, good dentition, moist mucous membranes. Phonation normal.  Neck: No masses, no thyromegaly. Moves freely without pain.  Respiratory: Unlabored respiratory effort, no cough or audible wheeze  Psych: Alert and oriented x3, normal affect and mood.       Assessment and Plan:   The following treatment plan was discussed:     1. Anxiety  2. Major depression, chronic    Been well with current medication.  We will continue with medication at this time.  The plan will be to follow-up in 4 weeks, sooner if needed.    3. Initiation of OCP (BCP)  - HCG QUANTITATIVE; Future    I have ordered her pregnancy test.  If this is negative, we will start patient on tri lo ketan as this is worked well for  her in the past.    Follow-up: No follow-ups on file.

## 2021-02-22 ENCOUNTER — PATIENT MESSAGE (OUTPATIENT)
Dept: MEDICAL GROUP | Facility: CLINIC | Age: 21
End: 2021-02-22

## 2021-02-22 DIAGNOSIS — F32.9 MAJOR DEPRESSION, CHRONIC: ICD-10-CM

## 2021-02-22 DIAGNOSIS — F41.9 ANXIETY: ICD-10-CM

## 2021-02-23 RX ORDER — ESCITALOPRAM OXALATE 10 MG/1
10 TABLET ORAL DAILY
Qty: 30 TABLET | Refills: 0 | Status: SHIPPED | OUTPATIENT
Start: 2021-02-23 | End: 2021-03-08 | Stop reason: SDUPTHER

## 2021-02-23 NOTE — TELEPHONE ENCOUNTER
From: Maddison Quinn  To: Physician Assistant Jihan Mireles  Sent: 2/22/2021 9:38 AM PST  Subject: Prescription Question    Good morning, I made an appointment to see if you can give me refills on my lexapro, I only have six tablets left and that won't last til March 8th and I have no refills, please and thank you

## 2021-02-23 NOTE — PATIENT COMMUNICATION
Was the patient seen in the last year in this department? Yes    Does patient have an active prescription for medications requested? Yes    Received Request Via: Patient    Hospital Outpatient Visit on 04/01/2020   Component Date Value   • [facility] 04/01/2020 59759.0*   Hospital Outpatient Visit on 03/30/2020   Component Date Value   • [facility] 03/30/2020 11017.0*   Initial Prenatal on 03/30/2020   Component Date Value   • POC Urine Pregnancy Test 03/30/2020 Positive    • Internal Control Positive 03/30/2020 Positive    • Internal Control Negative 03/30/2020 Negative    ]

## 2021-03-08 ENCOUNTER — TELEMEDICINE (OUTPATIENT)
Dept: MEDICAL GROUP | Facility: CLINIC | Age: 21
End: 2021-03-08
Payer: MEDICAID

## 2021-03-08 ENCOUNTER — HOSPITAL ENCOUNTER (OUTPATIENT)
Facility: MEDICAL CENTER | Age: 21
End: 2021-03-08
Attending: PHYSICIAN ASSISTANT
Payer: MEDICAID

## 2021-03-08 DIAGNOSIS — F41.9 ANXIETY: ICD-10-CM

## 2021-03-08 DIAGNOSIS — F32.9 MAJOR DEPRESSION, CHRONIC: ICD-10-CM

## 2021-03-08 DIAGNOSIS — Z30.011 INITIATION OF OCP (BCP): ICD-10-CM

## 2021-03-08 LAB
INT CON NEG: NEGATIVE
INT CON POS: POSITIVE
POC URINE PREGNANCY TEST: NEGATIVE

## 2021-03-08 PROCEDURE — 84702 CHORIONIC GONADOTROPIN TEST: CPT

## 2021-03-08 PROCEDURE — 99000 SPECIMEN HANDLING OFFICE-LAB: CPT | Performed by: PHYSICIAN ASSISTANT

## 2021-03-08 PROCEDURE — 81025 URINE PREGNANCY TEST: CPT | Performed by: PHYSICIAN ASSISTANT

## 2021-03-08 PROCEDURE — 99213 OFFICE O/P EST LOW 20 MIN: CPT | Mod: CR | Performed by: PHYSICIAN ASSISTANT

## 2021-03-08 RX ORDER — ESCITALOPRAM OXALATE 10 MG/1
10 TABLET ORAL DAILY
Qty: 90 TABLET | Refills: 1 | Status: SHIPPED
Start: 2021-03-08 | End: 2021-06-09

## 2021-03-08 NOTE — PROGRESS NOTES
Virtual Visit: Established Patient   This visit was conducted via Zoom using secure and encrypted videoconferencing technology. The patient was in a private location in the state of [place].    The patient's identity was confirmed and verbal consent was obtained for this virtual visit.    Subjective:   CC:   Chief Complaint   Patient presents with   • Medication Management     lexapro   • Contraception       Maddison Quinn is a 20 y.o. female presenting for evaluation and management of:    Chronic major depression.  Patient feels that the medication is working.  She states that she feels she is doing a lot better with the medication.  She would like to continue.   She would like to try a 90 day supply of the medication.  She states that she is still using the hydroxyzine when she has anxiety.  She states that she has only had to use twice with the medication.      Patient would like to come in to have her pregnancy testing done today.  She would like to start birth control as soon as she is able to.    ROS Denies any other symptoms unless previously indicated.   Denies any recent fevers or chills. No nausea or vomiting. No chest pains or shortness of breath.     No Known Allergies    Current medicines (including changes today)  Current Outpatient Medications   Medication Sig Dispense Refill   • escitalopram (LEXAPRO) 10 MG Tab Take 1 tablet by mouth every day. 90 tablet 1   • ibuprofen (MOTRIN) 600 MG Tab Take 600 mg by mouth.     • acetaminophen (TYLENOL) 325 MG Tab Take 650 mg by mouth.     • hydrOXYzine HCl (ATARAX) 25 MG Tab Take 1 Tab by mouth 3 times a day as needed for Anxiety. 60 Tab 0     No current facility-administered medications for this visit.       Patient Active Problem List    Diagnosis Date Noted   • Initiation of OCP (BCP) 01/12/2021   • Anxiety 12/29/2020   • Major depression, chronic 12/29/2020   • DUB (dysfunctional uterine bleeding) 03/30/2020       History reviewed. No pertinent family  history.    She  has a past medical history of Anxiety, Depression, Head ache, Urinary tract infection, and Vaginal delivery (11/13/2020). She also has no past medical history of Addisons disease ([facility]), Adrenal disorder ([facility]), Allergy, Anemia, Arrhythmia, Arthritis, Asthma, Blood transfusion without reported diagnosis, Cancer ([facility]), Cataract, CHF (congestive heart failure) ([facility]), Clotting disorder ([facility]), COPD (chronic obstructive pulmonary disease) ([facility]), Cushings syndrome ([facility]), Diabetes ([facility]), Diabetic neuropathy ([facility]), GERD (gastroesophageal reflux disease), Glaucoma, Goiter, Heart attack ([facility]), Heart murmur, HIV (human immunodeficiency virus infection) ([facility]), Hyperlipidemia, Hypertension, IBD (inflammatory bowel disease), Kidney disease, Meningitis, Migraine, Muscle disorder, Osteoporosis, Parathyroid disorder ([facility]), Pituitary disease ([facility]), Pulmonary emphysema ([facility]), Seizure ([facility]), Sickle cell disease ([facility]), Stroke ([facility]), Substance abuse ([facility]), Thyroid disease, or Tuberculosis.  She  has no past surgical history on file.       Objective:   There were no vitals taken for this visit.    Physical Exam:  Constitutional: Alert, no distress, well-groomed.  Skin: No rashes in visible areas.  Eye: Round. Conjunctiva clear, lids normal. No icterus.   ENMT: Lips pink without lesions, good dentition, moist mucous membranes. Phonation normal.  Neck: No masses, no thyromegaly. Moves freely without pain.  Respiratory: Unlabored respiratory effort, no cough or audible wheeze  Psych: Alert and oriented x3, normal affect and mood.       Assessment and Plan:   The following treatment plan was discussed:     1. Anxiety  - escitalopram (LEXAPRO) 10 MG Tab; Take 1 tablet by mouth every day.  Dispense: 90 tablet; Refill: 1  2. Major depression, chronic  - escitalopram (LEXAPRO) 10 MG Tab; Take 1 tablet by mouth every day.  Dispense: 90 tablet; Refill: 1    We will continue with medications at this time.  The plan will be to  follow-up in 3 months, sooner if needed.    3. Initiation of OCP (BCP)  - POC URINE PREGNANCY    Blood pregnancy test previously ordered.  We will also order urine pregnancy test which she can do today when she comes in.  If testing is negative, we will order birth control for her.    Follow-up: Return in about 3 months (around 6/8/2021), or if symptoms worsen or fail to improve.

## 2021-03-09 DIAGNOSIS — Z30.011 INITIATION OF OCP (BCP): ICD-10-CM

## 2021-03-09 LAB — B-HCG SERPL-ACNC: <1 MIU/ML (ref 0–5)

## 2021-03-09 RX ORDER — NORGESTIMATE AND ETHINYL ESTRADIOL 7DAYSX3 LO
1 KIT ORAL DAILY
Qty: 28 TABLET | Refills: 9 | Status: SHIPPED
Start: 2021-03-09 | End: 2021-06-09

## 2021-04-26 ENCOUNTER — PATIENT MESSAGE (OUTPATIENT)
Dept: MEDICAL GROUP | Facility: CLINIC | Age: 21
End: 2021-04-26

## 2021-04-26 DIAGNOSIS — Z30.9 ENCOUNTER FOR CONTRACEPTIVE MANAGEMENT, UNSPECIFIED TYPE: ICD-10-CM

## 2021-07-06 ENCOUNTER — TELEMEDICINE (OUTPATIENT)
Dept: MEDICAL GROUP | Facility: CLINIC | Age: 21
End: 2021-07-06
Payer: MEDICAID

## 2021-07-06 VITALS — WEIGHT: 110 LBS | HEIGHT: 64 IN | BODY MASS INDEX: 18.78 KG/M2

## 2021-07-06 DIAGNOSIS — M25.512 ACUTE PAIN OF LEFT SHOULDER: ICD-10-CM

## 2021-07-06 DIAGNOSIS — F32.9 MAJOR DEPRESSION, CHRONIC: ICD-10-CM

## 2021-07-06 PROCEDURE — 99213 OFFICE O/P EST LOW 20 MIN: CPT | Mod: CR | Performed by: PHYSICIAN ASSISTANT

## 2021-07-06 RX ORDER — MELOXICAM 7.5 MG/1
7.5 TABLET ORAL DAILY
Qty: 30 TABLET | Refills: 1 | Status: SHIPPED
Start: 2021-07-06 | End: 2021-07-27

## 2021-07-06 RX ORDER — SERTRALINE HYDROCHLORIDE 25 MG/1
25 TABLET, FILM COATED ORAL DAILY
Qty: 30 TABLET | Refills: 1 | Status: SHIPPED
Start: 2021-07-06 | End: 2021-07-27

## 2021-07-06 NOTE — PROGRESS NOTES
"Virtual Visit: Established Patient   This visit was conducted via Zoom using secure and encrypted videoconferencing technology. The patient was in a private location in the state of [place].    The patient's identity was confirmed and verbal consent was obtained for this virtual visit.    Subjective:   CC:   Chief Complaint   Patient presents with   • Medication Management   • Shoulder Pain     pt states there is still \"some\" pain but is doing good        Maddison Quinn is a 20 y.o. female presenting for evaluation and management of:    Follow up medication. Patient states that she feels that she is doing better on the sertraline.  She states that she has been taking half a pill as she felt a full pill would be too much for her stomach.      Patient states that her shoulder has been bothering her for 3 months and is worse when she does heavy lifting.  She states that she will not be able to lift her arm to shoulder length.  She will take advil, or aleve and it does not help.  She states that the joint is unstable and she will feel popping in the joint.  She states that her arm on the left side feels weak.  She did move in the last 3 months.      ROS denies any other symptoms unless previously indicated.  Denies any recent fevers or chills. No nausea or vomiting. No chest pains or shortness of breath.     No Known Allergies    Current medicines (including changes today)  Current Outpatient Medications   Medication Sig Dispense Refill   • sertraline (ZOLOFT) 25 MG tablet Take 1 tablet by mouth every day. 30 tablet 1   • meloxicam (MOBIC) 7.5 MG Tab Take 1 tablet by mouth every day. 30 tablet 1   • ibuprofen (MOTRIN) 600 MG Tab Take 600 mg by mouth.     • acetaminophen (TYLENOL) 325 MG Tab Take 650 mg by mouth.     • hydrOXYzine HCl (ATARAX) 25 MG Tab Take 1 Tab by mouth 3 times a day as needed for Anxiety. 60 Tab 0     No current facility-administered medications for this visit.       Patient Active Problem List    " "Diagnosis Date Noted   • Acute pain of left shoulder 07/06/2021   • Initiation of OCP (BCP) 01/12/2021   • Anxiety 12/29/2020   • Major depression, chronic 12/29/2020   • DUB (dysfunctional uterine bleeding) 03/30/2020       History reviewed. No pertinent family history.    She  has a past medical history of Anxiety, Depression, Head ache, Urinary tract infection, and Vaginal delivery (11/13/2020). She also has no past medical history of Addisons disease (HCC), Adrenal disorder (HCC), Allergy, Anemia, Arrhythmia, Arthritis, Asthma, Blood transfusion without reported diagnosis, Cancer ([facility]), Cataract, CHF (congestive heart failure) ([facility]), Clotting disorder (HCC), COPD (chronic obstructive pulmonary disease) ([facility]), Cushings syndrome ([facility]), Diabetes ([facility]), Diabetic neuropathy ([facility]), GERD (gastroesophageal reflux disease), Glaucoma, Goiter, Heart attack ([facility]), Heart murmur, HIV (human immunodeficiency virus infection) ([facility]), Hyperlipidemia, Hypertension, IBD (inflammatory bowel disease), Kidney disease, Meningitis, Migraine, Muscle disorder, Osteoporosis, Parathyroid disorder ([facility]), Pituitary disease (HCC), Pulmonary emphysema (HCC), Seizure ([facility]), Sickle cell disease ([facility]), Stroke ([facility]), Substance abuse ([facility]), Thyroid disease, or Tuberculosis.  She  has no past surgical history on file.       Objective:   Ht 1.626 m (5' 4\")   Wt 49.9 kg (110 lb)   BMI 18.88 kg/m²     Physical Exam:  Constitutional: Alert, no distress, well-groomed.  Skin: No rashes in visible areas.  Eye: Round. Conjunctiva clear, lids normal. No icterus.   ENMT: Lips pink without lesions, good dentition, moist mucous membranes. Phonation normal.  Neck: No masses, no thyromegaly. Moves freely without pain.  Respiratory: Unlabored respiratory effort, no cough or audible wheeze  Extremities: Patient able to hold up arms but has some weakness seen on the left side.  Psych: Alert and oriented x3, normal affect and mood.       Assessment and Plan:   The " following treatment plan was discussed:     1. Major depression, chronic  - sertraline (ZOLOFT) 25 MG tablet; Take 1 tablet by mouth every day.  Dispense: 30 tablet; Refill: 1       if patient is doing well with the 12-1/2 mg dose of sertraline, I am okay with continuing this medication at this dose.  Patient agrees as well and so we will continue at this time.    2. Acute pain of left shoulder  - DX-SHOULDER 2+ LEFT; Future  - meloxicam (MOBIC) 7.5 MG Tab; Take 1 tablet by mouth every day.  Dispense: 30 tablet; Refill: 1    My concern is for a possible tear.  However we will try meloxicam and x-rays of the shoulder to evaluate first.  We will follow-up in 2 to 4 weeks.  Consideration has been given to physical therapy.  If needed we will order at follow-up.    Follow-up: No follow-ups on file.

## 2021-07-12 ENCOUNTER — GYNECOLOGY VISIT (OUTPATIENT)
Dept: OBGYN | Facility: CLINIC | Age: 21
End: 2021-07-12
Payer: MEDICAID

## 2021-07-12 VITALS
HEIGHT: 63 IN | WEIGHT: 113 LBS | SYSTOLIC BLOOD PRESSURE: 102 MMHG | BODY MASS INDEX: 20.02 KG/M2 | DIASTOLIC BLOOD PRESSURE: 58 MMHG

## 2021-07-12 DIAGNOSIS — Z30.09 COUNSELING FOR BIRTH CONTROL, ORAL CONTRACEPTIVES: ICD-10-CM

## 2021-07-12 PROCEDURE — 99213 OFFICE O/P EST LOW 20 MIN: CPT | Performed by: NURSE PRACTITIONER

## 2021-07-12 NOTE — NON-PROVIDER
Patient here to discuss birth control   desires nexplanon  LMP=7/1/21  BCM: condoms  Last pap date/result: n/a  Last mammogram if applicable; n/a  Phone number: [phone]  Pharmacy confirmed.

## 2021-07-12 NOTE — PROGRESS NOTES
HPI Comments:  Maddison Quinn is a 20 y.o.. female who presents for problem gyn visit: contraceptive counseling.  Pt would like a Nexplanon.  Has friends who have used this method without any problem.  Recent pregnancy -- 5mos ago.  Gave her son up in an open adoption.  Has been under a lot of stress.  Presently taking medication for depression and anxiety.  Has a lot of stress at work. Pt had previously tried OCs with a lot of nausea. She also reports she did get some ovarian cysts as well.      Review of Systems :  Constitutional: none  EENT: none  Cardio: none  Resp: none  GI: none  : none  Pertinent positives documented in HPI and all other systems reviewed & are negative    All PMH, PSH, allergies, social history and FH reviewed and updated today:  Past Medical History:   Diagnosis Date   • Anxiety    • Depression    • Head ache    • Urinary tract infection    • Vaginal delivery 2020     History reviewed. No pertinent surgical history.  Patient has no known allergies.  Social History     Socioeconomic History   • Marital status: Single     Spouse name: Not on file   • Number of children: Not on file   • Years of education: Not on file   • Highest education level: Not on file   Occupational History   • Not on file   Tobacco Use   • Smoking status: Former Smoker     Packs/day: 0.20     Years: 0.10     Pack years: 0.02     Types: Cigarettes     Quit date: 2020     Years since quittin.4   • Smokeless tobacco: Never Used   • Tobacco comment: vapes now    Vaping Use   • Vaping Use: Every day   • Start date: 2020   • Substances: Nicotine   • Devices: Disposable   Substance and Sexual Activity   • Alcohol use: Not Currently     Comment: Seldom   • Drug use: Yes     Types: Marijuana   • Sexual activity: Yes     Partners: Male     Birth control/protection: Condom     Comment: havent been on BC pill for 8 months    Other Topics Concern   • Not on file   Social History Narrative   • Not on file  "    Social Determinants of Health     Financial Resource Strain:    • Difficulty of Paying Living Expenses:    Food Insecurity:    • Worried About Running Out of Food in the Last Year:    • Ran Out of Food in the Last Year:    Transportation Needs:    • Lack of Transportation (Medical):    • Lack of Transportation (Non-Medical):    Physical Activity:    • Days of Exercise per Week:    • Minutes of Exercise per Session:    Stress:    • Feeling of Stress :    Social Connections:    • Frequency of Communication with Friends and Family:    • Frequency of Social Gatherings with Friends and Family:    • Attends Yarsanism Services:    • Active Member of Clubs or Organizations:    • Attends Club or Organization Meetings:    • Marital Status:    Intimate Partner Violence:    • Fear of Current or Ex-Partner:    • Emotionally Abused:    • Physically Abused:    • Sexually Abused:      Family History   Problem Relation Age of Onset   • Stroke Father    • Colon Cancer Paternal Aunt    • Heart Disease Paternal Aunt      Medications:   Current Outpatient Medications Ordered in Epic   Medication Sig Dispense Refill   • sertraline (ZOLOFT) 25 MG tablet Take 1 tablet by mouth every day. 30 tablet 1   • meloxicam (MOBIC) 7.5 MG Tab Take 1 tablet by mouth every day. 30 tablet 1   • hydrOXYzine HCl (ATARAX) 25 MG Tab Take 1 Tab by mouth 3 times a day as needed for Anxiety. 60 Tab 0   • ibuprofen (MOTRIN) 600 MG Tab Take 600 mg by mouth. (Patient not taking: Reported on 7/12/2021)     • acetaminophen (TYLENOL) 325 MG Tab Take 650 mg by mouth. (Patient not taking: Reported on 7/12/2021)       No current [facility]-ordered facility-administered medications on file.          Objective:   Vital measurements:  /58 (BP Location: Right arm, Patient Position: Sitting)   Ht 1.6 m (5' 3\")   Wt 51.3 kg (113 lb)   Body mass index is 20.02 kg/m². (Goal BM I>18 <25)    Physical Exam   Nursing note and vitals reviewed.  Constitutional: She is oriented " to person, place, and time. She appears well-developed and well-nourished. No distress.     Breast: deferred    Genitourinary: deferred    Neurological: She is alert and oriented to person, place, and time. She exhibits normal muscle tone.     Skin: Skin is warm and dry. No rash noted. She is not diaphoretic. No erythema. No pallor.     Psychiatric: She has a normal mood and affect. Her behavior is normal. Judgment and thought content normal.        Assessment:     1. Counseling for birth control, oral contraceptives           Plan:   Limited exam performed   Pt handout given on Nexplanon.  Counseling: use and side effects of OCPs and family planning choices.  D/w pt Nexplanon, also reviewed irr VB and/or amenorrhea which can be noted from 6-12mos. D/w pt possible worsening of depression and anxiety.   F/u 1-2wks for Nexplanon insertion.    No follow-ups on file.

## 2021-07-27 ENCOUNTER — TELEMEDICINE (OUTPATIENT)
Dept: MEDICAL GROUP | Facility: CLINIC | Age: 21
End: 2021-07-27
Payer: MEDICAID

## 2021-07-27 VITALS — HEIGHT: 63 IN | WEIGHT: 113 LBS | BODY MASS INDEX: 20.02 KG/M2

## 2021-07-27 DIAGNOSIS — M25.512 ACUTE PAIN OF LEFT SHOULDER: ICD-10-CM

## 2021-07-27 DIAGNOSIS — J01.10 ACUTE NON-RECURRENT FRONTAL SINUSITIS: ICD-10-CM

## 2021-07-27 DIAGNOSIS — F32.9 MAJOR DEPRESSION, CHRONIC: ICD-10-CM

## 2021-07-27 PROCEDURE — 99214 OFFICE O/P EST MOD 30 MIN: CPT | Mod: CR | Performed by: PHYSICIAN ASSISTANT

## 2021-07-27 RX ORDER — MONTELUKAST SODIUM 10 MG/1
10 TABLET ORAL DAILY
Qty: 30 TABLET | Refills: 2 | Status: SHIPPED | OUTPATIENT
Start: 2021-07-27

## 2021-07-27 RX ORDER — FLUTICASONE PROPIONATE 50 MCG
1 SPRAY, SUSPENSION (ML) NASAL DAILY
Qty: 16 G | Refills: 2 | Status: SHIPPED | OUTPATIENT
Start: 2021-07-27

## 2021-07-27 RX ORDER — AMOXICILLIN 875 MG/1
875 TABLET, COATED ORAL 2 TIMES DAILY
Qty: 20 TABLET | Refills: 0 | Status: SHIPPED
Start: 2021-07-27 | End: 2021-10-04
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
## 2021-07-30 ENCOUNTER — TELEPHONE (OUTPATIENT)
Dept: OBGYN | Facility: CLINIC | Age: 21
End: 2021-07-30

## 2021-07-30 NOTE — TELEPHONE ENCOUNTER
Pt called to confirmed if she is allowed to bring her boyfriend on her upcoming appointment 8/2/2021 with Dr. Lawrence for Nexplanon insert. Advised Pt that we are still allowing at least 2 visitors. Pt has no further questions.

## 2021-08-10 ENCOUNTER — GYNECOLOGY VISIT (OUTPATIENT)
Dept: OBGYN | Facility: CLINIC | Age: 21
End: 2021-08-10
Payer: MEDICAID

## 2021-08-10 VITALS — DIASTOLIC BLOOD PRESSURE: 68 MMHG | BODY MASS INDEX: 20.02 KG/M2 | WEIGHT: 113 LBS | SYSTOLIC BLOOD PRESSURE: 102 MMHG

## 2021-08-10 DIAGNOSIS — Z30.017 NEXPLANON INSERTION: ICD-10-CM

## 2021-08-10 LAB
INT CON NEG: NORMAL
INT CON POS: NORMAL
POC URINE PREGNANCY TEST: NEGATIVE

## 2021-08-10 PROCEDURE — 11981 INSERTION DRUG DLVR IMPLANT: CPT | Mod: GC | Performed by: OBSTETRICS & GYNECOLOGY

## 2021-08-10 PROCEDURE — 81025 URINE PREGNANCY TEST: CPT | Performed by: OBSTETRICS & GYNECOLOGY

## 2021-08-10 NOTE — PROCEDURES
Procedure Note : Nexplanon Insertion      CC: desires nexplanon     HPI: 20 y.o.yo  here for desired nexplanon insertion.  Denies concerns  UPT neg     Past Medical History:   Diagnosis Date   • Anxiety    • Depression    • Head ache    • Urinary tract infection    • Vaginal delivery 2020     Allergies: Patient has no known allergies.       ROS:   Gen: denies concerns  : denies concerns        Nexplanon Insertion  Pt is right handed  Left upper extremity positioned so the medial aspect exposed.  Inferior to the groove between the biceps/triceps, over the triceps, and approximately 8-10cm from the medial epicondyle  cleansed with betadine x3. approximately 3cc of 1% licocaine infiltrated along planned insertion path.    Nexplanon applicator placed with bevel of needle down, skin lifted, device inserted to hub   Lever deployed and nexplanon released. Implant palpated by myself and patient in proper location   Small needle puncture hemostatic, steri strip placed, pressure dressing applied  Pt tolerated procedures well.  Complications: none     A/P: 20 y.o.  s/p desired nexplanon insertion   - prior to insertion discussed risks including pain/bleeding/infection. Advised to expect some bruising, NSAIDs/tylenol prn discomfort.  Discussed likely irregular VB, possible amenorrhea with nexplanon as well as risk of pregnancy.  All questions answered and consents signed.  Remove pressure dressing tonight or when uncomfortable

## 2021-08-31 ENCOUNTER — PATIENT MESSAGE (OUTPATIENT)
Dept: MEDICAL GROUP | Facility: CLINIC | Age: 21
End: 2021-08-31

## 2021-08-31 DIAGNOSIS — N93.8 DUB (DYSFUNCTIONAL UTERINE BLEEDING): ICD-10-CM

## 2021-09-07 ENCOUNTER — TELEPHONE (OUTPATIENT)
Dept: OBGYN | Facility: CLINIC | Age: 21
End: 2021-09-07

## 2021-09-07 NOTE — TELEPHONE ENCOUNTER
Patient called today in regards to some red brown bleeding that she has been having since getting her Nexplanon placed on 08/10/2021. Patient states she was told that they would put on her a pill to help regulate this if it didn't not get better. Let patient know I would have to consult with Dr. Lawrence as soon as she was out of the room with patients. Patient understood and had no further questions, will be waiting on the call back.

## 2021-09-27 ENCOUNTER — HOSPITAL ENCOUNTER (OUTPATIENT)
Facility: MEDICAL CENTER | Age: 21
End: 2021-09-27
Attending: PHYSICIAN ASSISTANT
Payer: MEDICAID

## 2021-09-27 ENCOUNTER — OFFICE VISIT (OUTPATIENT)
Dept: MEDICAL GROUP | Facility: CLINIC | Age: 21
End: 2021-09-27
Payer: MEDICAID

## 2021-09-27 VITALS
HEART RATE: 69 BPM | RESPIRATION RATE: 16 BRPM | BODY MASS INDEX: 19.29 KG/M2 | OXYGEN SATURATION: 96 % | SYSTOLIC BLOOD PRESSURE: 102 MMHG | WEIGHT: 113 LBS | TEMPERATURE: 98.5 F | HEIGHT: 64 IN | DIASTOLIC BLOOD PRESSURE: 52 MMHG

## 2021-09-27 DIAGNOSIS — N93.8 DUB (DYSFUNCTIONAL UTERINE BLEEDING): ICD-10-CM

## 2021-09-27 DIAGNOSIS — Z12.4 PAPANICOLAOU SMEAR: ICD-10-CM

## 2021-09-27 DIAGNOSIS — Z97.5 NEXPLANON IN PLACE: ICD-10-CM

## 2021-09-27 DIAGNOSIS — M79.602 PAIN OF LEFT UPPER EXTREMITY: ICD-10-CM

## 2021-09-27 PROCEDURE — 88175 CYTOPATH C/V AUTO FLUID REDO: CPT

## 2021-09-27 PROCEDURE — 99000 SPECIMEN HANDLING OFFICE-LAB: CPT | Performed by: PHYSICIAN ASSISTANT

## 2021-09-27 PROCEDURE — 82746 ASSAY OF FOLIC ACID SERUM: CPT

## 2021-09-27 PROCEDURE — 83550 IRON BINDING TEST: CPT

## 2021-09-27 PROCEDURE — 82607 VITAMIN B-12: CPT

## 2021-09-27 PROCEDURE — 87624 HPV HI-RISK TYP POOLED RSLT: CPT

## 2021-09-27 PROCEDURE — 85025 COMPLETE CBC W/AUTO DIFF WBC: CPT

## 2021-09-27 PROCEDURE — 99213 OFFICE O/P EST LOW 20 MIN: CPT | Performed by: PHYSICIAN ASSISTANT

## 2021-09-27 PROCEDURE — 83540 ASSAY OF IRON: CPT

## 2021-09-27 RX ORDER — CEPHALEXIN 250 MG/1
250 CAPSULE ORAL 4 TIMES DAILY
Qty: 40 CAPSULE | Refills: 0 | Status: SHIPPED | OUTPATIENT
Start: 2021-09-27

## 2021-09-27 NOTE — PROGRESS NOTES
cc:  Post partum pap    Subjective:     Maddison Quinn is a 20 y.o. female presenting for post partum pap      Patient presents to the office for post partum pap.    Patient presents the office today in need of her 6-month postpartum Pap.  She has been seeing gynecology.  She did have the Nexplanon implanted by gynecology approximately on the fourth.  She does noticed that her periods have been lighter and still at least every 2 weeks.  But more manageable.  She has noticed that there has been some pain in her arm where the Nexplanon has been implanted and she is concerned about this.  She is also due for routine lab work and would like to have that drawn today.  She denies any other symptoms or issues.  She does acknowledge that with the Nexplanon implantation, the pain has been improving slightly since it has been implanted.  She ranks this pain as a 2 on a scale of 1-10 10 being the worst pain.  And she describes the pain as sharp when it occurs.    Review of systems:  See above.   Denies any symptoms unless previously indicated.        Current Outpatient Medications:   •  cephALEXin (KEFLEX) 250 MG Cap, Take 1 Capsule by mouth 4 times a day., Disp: 40 Capsule, Rfl: 0  •  montelukast (SINGULAIR) 10 MG Tab, Take 1 tablet by mouth every day., Disp: 30 tablet, Rfl: 2  •  ibuprofen (MOTRIN) 600 MG Tab, Take 600 mg by mouth., Disp: , Rfl:   •  acetaminophen (TYLENOL) 325 MG Tab, Take 650 mg by mouth., Disp: , Rfl:   •  hydrOXYzine HCl (ATARAX) 25 MG Tab, Take 1 Tab by mouth 3 times a day as needed for Anxiety., Disp: 60 Tab, Rfl: 0  •  amoxicillin (AMOXIL) 875 MG tablet, Take 1 tablet by mouth 2 times a day. (Patient not taking: Reported on 9/27/2021), Disp: 20 tablet, Rfl: 0  •  fluticasone (FLONASE) 50 MCG/ACT nasal spray, Administer 1 Spray into affected nostril(S) every day. (Patient not taking: Reported on 9/27/2021), Disp: 16 g, Rfl: 2    Allergies, past medical history, past surgical history, family history,  "social history reviewed and updated    Objective:     Vitals: /52 (BP Location: Left arm, Patient Position: Sitting, BP Cuff Size: Adult)   Pulse 69   Temp 36.9 °C (98.5 °F) (Temporal)   Resp 16   Ht 1.626 m (5' 4\") Comment: stated by pt  Wt 51.3 kg (113 lb)   SpO2 96%   BMI 19.40 kg/m²   General: Alert, pleasant, NAD  EYES:   PERRL, EOMI, no icterus or pallor.  Conjunctivae and lids normal.   HENT:  Normocephalic.  External ears normal.  Neck supple.     Respiratory: Normal respiratory effort.    Abdomen: Non-distended, soft, non-tender, no guarding/rebound. Bowel sounds present.  No hepatosplenomegaly.  No hernias.  Skin: Warm, dry, no rashes.  Musculoskeletal: Gait is normal.  Moves all extremities well.    Extremities: Nexplanon is palpable in medial lateral upper left arm. There is some mild pain with palpation.  It does not appear erythematous at this time   Pelvic exam: Normal external genitalia including urethral meatus.  Well supported, nontender urethra and bladder.  Vagina and cervix without significant discharge or lesions.  No cervical motion tenderness.  Anteflexed uterus of normal size, shape, and consistency.  Uterus tilted to patient's left.  No adnexal masses or tenderness.     Neurological: No tremors, sensation grossly intact, patellar reflexes 2+ symmetric, tone/strength normal, gait is normal, CN2-12 intact.  Psych:  Affect/mood is normal, judgement is good, memory is intact, grooming is appropriate.    Assessment/Plan:     Maddison was seen today for gynecologic exam.    Diagnoses and all orders for this visit:    Papanicolaou smear  DUB (dysfunctional uterine bleeding)    Cervical specimen collected and will be sent to the lab for analysis today.  Will notify patient of results when received.    Pain of left upper extremity  -     cephALEXin (KEFLEX) 250 MG Cap; Take 1 Capsule by mouth 4 times a day.  Nexplanon in place    It may be that the Nexplanon is sitting on a nerve.  It " could be that it just needs a little more time for the patient to adjust.  However it is warm and tender and so we will try an antibiotic just to make sure there is not an infectious process.  Patient will let me know about her symptoms when she is finished the antibiotic.    As of note, I did apologize to patient for inaccurate information regarding anti-inflammatory use and her irregular periods.  I did receive corrective information from gynecology and have relayed to the patient.        No follow-ups on file.    Please note that this dictation was created using voice recognition software. I have made every reasonable attempt to correct obvious errors, but expect that there are errors of grammar and possible content that I did not discover before finalizing note.

## 2021-09-28 DIAGNOSIS — Z12.4 PAPANICOLAOU SMEAR: ICD-10-CM

## 2021-09-28 DIAGNOSIS — N93.8 DUB (DYSFUNCTIONAL UTERINE BLEEDING): ICD-10-CM

## 2021-09-28 LAB
BASOPHILS # BLD AUTO: 0.7 % (ref 0–1.8)
BASOPHILS # BLD: 0.06 K/UL (ref 0–0.12)
CYTOLOGY REG CYTOL: ABNORMAL
EOSINOPHIL # BLD AUTO: 0.12 K/UL (ref 0–0.51)
EOSINOPHIL NFR BLD: 1.4 % (ref 0–6.9)
ERYTHROCYTE [DISTWIDTH] IN BLOOD BY AUTOMATED COUNT: 44.4 FL (ref 35.9–50)
FOLATE SERPL-MCNC: 14.4 NG/ML
HCT VFR BLD AUTO: 39.9 % (ref 37–47)
HGB BLD-MCNC: 13.4 G/DL (ref 12–16)
HPV HR 12 DNA CVX QL NAA+PROBE: POSITIVE
HPV16 DNA SPEC QL NAA+PROBE: NEGATIVE
HPV18 DNA SPEC QL NAA+PROBE: NEGATIVE
IMM GRANULOCYTES # BLD AUTO: 0.03 K/UL (ref 0–0.11)
IMM GRANULOCYTES NFR BLD AUTO: 0.4 % (ref 0–0.9)
IRON SATN MFR SERPL: 19 % (ref 15–55)
IRON SERPL-MCNC: 56 UG/DL (ref 40–170)
LYMPHOCYTES # BLD AUTO: 1.62 K/UL (ref 1–4.8)
LYMPHOCYTES NFR BLD: 19 % (ref 22–41)
MCH RBC QN AUTO: 31.4 PG (ref 27–33)
MCHC RBC AUTO-ENTMCNC: 33.6 G/DL (ref 33.6–35)
MCV RBC AUTO: 93.4 FL (ref 81.4–97.8)
MONOCYTES # BLD AUTO: 0.41 K/UL (ref 0–0.85)
MONOCYTES NFR BLD AUTO: 4.8 % (ref 0–13.4)
NEUTROPHILS # BLD AUTO: 6.28 K/UL (ref 2–7.15)
NEUTROPHILS NFR BLD: 73.7 % (ref 44–72)
NRBC # BLD AUTO: 0 K/UL
NRBC BLD-RTO: 0 /100 WBC
PLATELET # BLD AUTO: 210 K/UL (ref 164–446)
PMV BLD AUTO: 11.7 FL (ref 9–12.9)
RBC # BLD AUTO: 4.27 M/UL (ref 4.2–5.4)
SPECIMEN SOURCE: ABNORMAL
TIBC SERPL-MCNC: 293 UG/DL (ref 250–450)
UIBC SERPL-MCNC: 237 UG/DL (ref 110–370)
VIT B12 SERPL-MCNC: 541 PG/ML (ref 211–911)
WBC # BLD AUTO: 8.5 K/UL (ref 4.8–10.8)

## 2021-10-04 ENCOUNTER — TELEMEDICINE (OUTPATIENT)
Dept: MEDICAL GROUP | Facility: CLINIC | Age: 21
End: 2021-10-04
Payer: MEDICAID

## 2021-10-04 VITALS — BODY MASS INDEX: 20.02 KG/M2 | WEIGHT: 113 LBS | HEIGHT: 63 IN

## 2021-10-04 DIAGNOSIS — R87.610 ATYPICAL SQUAMOUS CELLS OF UNDETERMINED SIGNIFICANCE ON CYTOLOGIC SMEAR OF CERVIX (ASC-US): ICD-10-CM

## 2021-10-04 DIAGNOSIS — B97.7 HPV (HUMAN PAPILLOMA VIRUS) INFECTION: ICD-10-CM

## 2021-10-04 PROBLEM — R85.619 ABNORMAL PAP SMEAR OF ANUS: Status: ACTIVE | Noted: 2021-10-04

## 2021-10-04 PROCEDURE — 99213 OFFICE O/P EST LOW 20 MIN: CPT | Mod: CR | Performed by: PHYSICIAN ASSISTANT
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
## 2021-10-18 PROBLEM — R85.619 ABNORMAL PAP SMEAR OF ANUS: Status: RESOLVED | Noted: 2021-10-04 | Resolved: 2021-10-18

## 2021-10-18 PROBLEM — R87.610 ATYPICAL SQUAMOUS CELLS OF UNDETERMINED SIGNIFICANCE ON CYTOLOGIC SMEAR OF CERVIX (ASC-US): Status: ACTIVE | Noted: 2021-10-18

## 2022-03-28 ENCOUNTER — TELEPHONE (OUTPATIENT)
Dept: MEDICAL GROUP | Facility: CLINIC | Age: 22
End: 2022-03-28
Payer: MEDICAID

## 2022-03-28 DIAGNOSIS — N93.8 DUB (DYSFUNCTIONAL UTERINE BLEEDING): ICD-10-CM

## 2022-03-28 NOTE — TELEPHONE ENCOUNTER
Phone Number Called: [phone] (home)       Call outcome: Spoke to patient regarding message below.    Message: spoke with pt and informed her of referral for Gynecology. She states that she does have an appointment in November for a PAP. She states that she has had 3 periods this month and thinks her birth control is in the wrong spot. Jihan has had her on antibiotics.     She states that she called the facility and they stated that she needs a new referral to be seen for birth control issues.

## 2022-09-02 ENCOUNTER — TELEPHONE (OUTPATIENT)
Dept: MEDICAL GROUP | Facility: CLINIC | Age: 22
End: 2022-09-02
Payer: MEDICAID

## 2024-07-22 ENCOUNTER — PATIENT MESSAGE (OUTPATIENT)
Dept: MEDICAL GROUP | Facility: CLINIC | Age: 24
End: 2024-07-22
Payer: MEDICAID

## 2024-07-22 DIAGNOSIS — Z30.09 COUNSELING FOR BIRTH CONTROL, ORAL CONTRACEPTIVES: ICD-10-CM

## 2024-09-10 ENCOUNTER — APPOINTMENT (OUTPATIENT)
Dept: OBGYN | Facility: CLINIC | Age: 24
End: 2024-09-10
Payer: MEDICAID

## 2024-09-17 ENCOUNTER — HOSPITAL ENCOUNTER (OUTPATIENT)
Facility: MEDICAL CENTER | Age: 24
End: 2024-09-17
Payer: MEDICAID

## 2024-09-17 ENCOUNTER — GYNECOLOGY VISIT (OUTPATIENT)
Dept: OBGYN | Facility: CLINIC | Age: 24
End: 2024-09-17
Payer: MEDICAID

## 2024-09-17 VITALS — DIASTOLIC BLOOD PRESSURE: 70 MMHG | SYSTOLIC BLOOD PRESSURE: 112 MMHG | BODY MASS INDEX: 21.43 KG/M2 | WEIGHT: 121 LBS

## 2024-09-17 DIAGNOSIS — Z97.5 NEXPLANON IN PLACE: ICD-10-CM

## 2024-09-17 DIAGNOSIS — Z30.46 ENCOUNTER FOR NEXPLANON REMOVAL: ICD-10-CM

## 2024-09-17 DIAGNOSIS — Z01.419 WELL WOMAN EXAM WITH ROUTINE GYNECOLOGICAL EXAM: ICD-10-CM

## 2024-09-17 PROBLEM — Z30.09 COUNSELING FOR BIRTH CONTROL, ORAL CONTRACEPTIVES: Status: RESOLVED | Noted: 2021-07-12 | Resolved: 2024-09-17

## 2024-09-17 LAB
POCT INT CON NEG: NEGATIVE
POCT INT CON POS: POSITIVE
POCT URINE PREGNANCY TEST: NEGATIVE

## 2024-09-17 PROCEDURE — 87491 CHLMYD TRACH DNA AMP PROBE: CPT

## 2024-09-17 PROCEDURE — 87591 N.GONORRHOEAE DNA AMP PROB: CPT

## 2024-09-17 PROCEDURE — 88175 CYTOPATH C/V AUTO FLUID REDO: CPT

## 2024-09-17 NOTE — PROCEDURES
Maddison Quinn is a 23 y.o. y.o. female who presents for Nexplanon Insertion        [facility] Comments: Pt presents for Nexplanon Insertion.  Patient's last menstrual period was 09/12/2024.  Nexplanon removed on left arm.    .  Review of Systems   Pertinent positives documented in HPI and all other systems reviewed & are negative      Past Medical History:   Diagnosis Date    Anxiety     Depression     Head ache     Urinary tract infection     Vaginal delivery 11/13/2020       Medications:   Current Outpatient Medications Ordered in Epic   Medication Sig Dispense Refill    hydrOXYzine HCl (ATARAX) 25 MG Tab Take 1 Tablet by mouth 3 times a day as needed for Anxiety. (Patient not taking: Reported on 9/17/2024) 30 Tablet 0    cephALEXin (KEFLEX) 250 MG Cap Take 1 Capsule by mouth 4 times a day. (Patient not taking: Reported on 10/4/2021) 40 Capsule 0    fluticasone (FLONASE) 50 MCG/ACT nasal spray Administer 1 Spray into affected nostril(S) every day. (Patient not taking: Reported on 9/17/2024) 16 g 2    montelukast (SINGULAIR) 10 MG Tab Take 1 tablet by mouth every day. (Patient not taking: Reported on 9/17/2024) 30 tablet 2    ibuprofen (MOTRIN) 600 MG Tab Take 600 mg by mouth. (Patient not taking: Reported on 9/17/2024)      acetaminophen (TYLENOL) 325 MG Tab Take 650 mg by mouth. (Patient not taking: Reported on 9/17/2024)       Current Facility-Administered Medications Ordered in Epic   Medication Dose Route Frequency Provider Last Rate Last Admin    etonogestrel (Nexplanon) implant 1 Each  1 Each Subdermal Once              Objective  Well nourished female in no acute distress  A& O x 3  Respirations even and non labored.  Skin is moist, without erythema or rashes  Patient with normal mood and affect; good insight and judgement        Patient given Postoperative Advice  Take NSAIDS and tylenol for pain, ice packs prn bruising/discomfort  Remove gauze wrap after 24 hrs, or at anytime it becomes uncomfortable   Steri  Strips to be removed at 3-4 days  RTC as needed    Lot# [id number]  Exp: 2026/10

## 2024-09-17 NOTE — PROCEDURES
Maddison Quinn is a 23 y.o. y.o. female who presents for Nexplanon Removal        HPI Comments: Pt presents for Nexplanon Removal.  Patient's last menstrual period was 09/12/2024. Patient desires removal of Nexplanon because it has been in place for 3 years. Pt desires another one placed today.     Review of Systems   Pertinent positives documented in HPI and all other systems reviewed & are negative      Past Medical History:   Diagnosis Date    Anxiety     Depression     Head ache     Urinary tract infection     Vaginal delivery 11/13/2020       Medications:   Current Outpatient Medications Ordered in Epic   Medication Sig Dispense Refill    hydrOXYzine HCl (ATARAX) 25 MG Tab Take 1 Tablet by mouth 3 times a day as needed for Anxiety. (Patient not taking: Reported on 9/17/2024) 30 Tablet 0    cephALEXin (KEFLEX) 250 MG Cap Take 1 Capsule by mouth 4 times a day. (Patient not taking: Reported on 10/4/2021) 40 Capsule 0    fluticasone (FLONASE) 50 MCG/ACT nasal spray Administer 1 Spray into affected nostril(S) every day. (Patient not taking: Reported on 9/17/2024) 16 g 2    montelukast (SINGULAIR) 10 MG Tab Take 1 tablet by mouth every day. (Patient not taking: Reported on 9/17/2024) 30 tablet 2    ibuprofen (MOTRIN) 600 MG Tab Take 600 mg by mouth. (Patient not taking: Reported on 9/17/2024)      acetaminophen (TYLENOL) 325 MG Tab Take 650 mg by mouth. (Patient not taking: Reported on 9/17/2024)       Current Facility-Administered Medications Ordered in Epic   Medication Dose Route Frequency Provider Last Rate Last Admin    etonogestrel (Nexplanon) implant 1 Each  1 Each Subdermal Once               Objective:   Vital measurements:  /70   Wt 121 lb   Body mass index is 21.43 kg/m². (Goal BM I>18 <25)    Physical Exam   Nursing note and vitals reviewed.  Constitutional: She is oriented to person, place, and time. She appears well-developed and well-nourished. No distress.     Musculoskeletal: Normal range of  motion. She exhibits no edema and no tenderness.     Skin: Skin is warm and dry. No rash noted. She is not diaphoretic. No erythema. No pallor.     Psychiatric: She has a normal mood and affect. Her behavior is normal. Judgment and thought content normal.          Assessment:   1. Encounter for Nexplanon removal  Left arm  - Consent for all Surgical, Special Diagnostic or Therapeutic Procedures  - POCT Pregnancy    2. Well woman exam with routine gynecological exam    - etonogestrel (Nexplanon) implant 1 Each  - THINPREP RFLX HPV ASCUS W/CTNG; Future    3. Nexplanon in place        Plan:   1. Discussed care of site with patient. Patient aware that some bruising may exist over the next 3-5 days. Keep site clean and dry.   2. Desires Nexplanon for birth control.   3. Patient plans on attempting pregnancy. Discuss importance of general health, taking prenatal vitamin, and avoidance of alcohol, tobacco and drugs.  4. RTC yearly for well exams and prn.
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
## 2024-09-17 NOTE — PROGRESS NOTES
Pt here for Nexplanon removal/ insertion. Pregnancy test was Negative . Consent signed.   Pt states no complaints   Good# [phone]  LMP: 9/12/2024  Pharmacy confirmed.

## 2024-09-22 LAB
C TRACH RRNA CVX QL NAA+PROBE: NEGATIVE
COMMENT NL11729A: NORMAL
CYTOLOGIST CVX/VAG CYTO: NORMAL
CYTOLOGY CVX/VAG DOC CYTO: NORMAL
CYTOLOGY CVX/VAG DOC THIN PREP: NORMAL
N GONORRHOEA RRNA CVX QL NAA+PROBE: NEGATIVE
NOTE NL11727A: NORMAL
OTHER STN SPEC: NORMAL
STAT OF ADQ CVX/VAG CYTO-IMP: NORMAL

## 2024-10-15 ENCOUNTER — APPOINTMENT (OUTPATIENT)
Dept: OBGYN | Facility: CLINIC | Age: 24
End: 2024-10-15
Payer: MEDICAID

## 2025-06-27 ENCOUNTER — OFFICE VISIT (OUTPATIENT)
Dept: URGENT CARE | Facility: PHYSICIAN GROUP | Age: 25
End: 2025-06-27
Payer: MEDICAID

## 2025-06-27 ENCOUNTER — HOSPITAL ENCOUNTER (OUTPATIENT)
Facility: MEDICAL CENTER | Age: 25
End: 2025-06-27
Attending: NURSE PRACTITIONER
Payer: MEDICAID

## 2025-06-27 VITALS
HEART RATE: 96 BPM | DIASTOLIC BLOOD PRESSURE: 70 MMHG | WEIGHT: 130 LBS | RESPIRATION RATE: 16 BRPM | BODY MASS INDEX: 22.2 KG/M2 | OXYGEN SATURATION: 97 % | TEMPERATURE: 97.8 F | SYSTOLIC BLOOD PRESSURE: 118 MMHG | HEIGHT: 64 IN

## 2025-06-27 DIAGNOSIS — J02.9 PHARYNGITIS, UNSPECIFIED ETIOLOGY: Primary | ICD-10-CM

## 2025-06-27 DIAGNOSIS — J02.9 PHARYNGITIS, UNSPECIFIED ETIOLOGY: ICD-10-CM

## 2025-06-27 DIAGNOSIS — H10.9 BACTERIAL CONJUNCTIVITIS: ICD-10-CM

## 2025-06-27 DIAGNOSIS — B96.89 ACUTE BACTERIAL SINUSITIS: ICD-10-CM

## 2025-06-27 DIAGNOSIS — J01.90 ACUTE BACTERIAL SINUSITIS: ICD-10-CM

## 2025-06-27 LAB
FLUAV RNA SPEC QL NAA+PROBE: NEGATIVE
FLUBV RNA SPEC QL NAA+PROBE: NEGATIVE
RSV RNA SPEC QL NAA+PROBE: NEGATIVE
S PYO DNA SPEC NAA+PROBE: NOT DETECTED
SARS-COV-2 RNA RESP QL NAA+PROBE: NEGATIVE

## 2025-06-27 PROCEDURE — 87070 CULTURE OTHR SPECIMN AEROBIC: CPT

## 2025-06-27 RX ORDER — AMOXICILLIN 500 MG/1
500 CAPSULE ORAL 2 TIMES DAILY
Qty: 14 CAPSULE | Refills: 0 | Status: SHIPPED | OUTPATIENT
Start: 2025-06-27 | End: 2025-07-04

## 2025-06-27 RX ORDER — DEXAMETHASONE SODIUM PHOSPHATE 10 MG/ML
10 INJECTION, SOLUTION INTRA-ARTICULAR; INTRALESIONAL; INTRAMUSCULAR; INTRAVENOUS; SOFT TISSUE ONCE
Status: COMPLETED | OUTPATIENT
Start: 2025-06-27 | End: 2025-06-27

## 2025-06-27 RX ORDER — TOBRAMYCIN 3 MG/ML
1 SOLUTION/ DROPS OPHTHALMIC EVERY 4 HOURS
Qty: 5 ML | Refills: 0 | Status: SHIPPED | OUTPATIENT
Start: 2025-06-27 | End: 2025-07-04

## 2025-06-27 RX ADMIN — DEXAMETHASONE SODIUM PHOSPHATE 10 MG: 10 INJECTION, SOLUTION INTRA-ARTICULAR; INTRALESIONAL; INTRAMUSCULAR; INTRAVENOUS; SOFT TISSUE at 18:18

## 2025-06-27 ASSESSMENT — ENCOUNTER SYMPTOMS: EYE DISCHARGE: 1
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
## 2025-06-29 LAB
BACTERIA SPEC RESP CULT: NORMAL
SIGNIFICANT IND 70042: NORMAL
SITE SITE: NORMAL
SOURCE SOURCE: NORMAL